# Patient Record
Sex: FEMALE | Race: WHITE | NOT HISPANIC OR LATINO | URBAN - METROPOLITAN AREA
[De-identification: names, ages, dates, MRNs, and addresses within clinical notes are randomized per-mention and may not be internally consistent; named-entity substitution may affect disease eponyms.]

---

## 2019-10-21 ENCOUNTER — OFFICE (OUTPATIENT)
Dept: URBAN - METROPOLITAN AREA CLINIC 101 | Facility: CLINIC | Age: 69
End: 2019-10-21

## 2019-10-21 VITALS
TEMPERATURE: 98.1 F | HEART RATE: 65 BPM | SYSTOLIC BLOOD PRESSURE: 136 MMHG | HEIGHT: 64 IN | WEIGHT: 148 LBS | DIASTOLIC BLOOD PRESSURE: 77 MMHG

## 2019-10-21 DIAGNOSIS — K21.9 GASTRO-ESOPHAGEAL REFLUX DISEASE WITHOUT ESOPHAGITIS: ICD-10-CM

## 2019-10-21 DIAGNOSIS — R13.10 DYSPHAGIA, UNSPECIFIED: ICD-10-CM

## 2019-10-21 DIAGNOSIS — B96.81 HELICOBACTER PYLORI [H. PYLORI] AS THE CAUSE OF DISEASES CLA: ICD-10-CM

## 2019-10-21 DIAGNOSIS — K22.10 ULCER OF ESOPHAGUS WITHOUT BLEEDING: ICD-10-CM

## 2019-10-21 DIAGNOSIS — R05 COUGH: ICD-10-CM

## 2019-10-21 PROCEDURE — 99204 OFFICE O/P NEW MOD 45 MIN: CPT

## 2019-10-21 NOTE — SERVICEHPINOTES
RAMIREZ BARGER   is a   69  female who presents with stomach pain and cough. Hx of GERD. Originally from [place] Had an EGD in August 2019 and it showed esophageal ulcerations. Treated with Clarithromycin and Amoxicillin. Advised to repeat EGD to check healing and now she is here. BRUsed to take omeprazole 20 mg once daily over 5 years.  This was increased to 40 mg for about 6 months ago. She was already taking the 40 mg when she had the EGD in August. BRDenies nausea, vomiting or weight loss. + Cough over a couple of years which attributes to GERD. BRTrouble swallowing with both liquids and solids but more with solids. BRThe acid reflux is better with PPI 40 mg daily. Occasional epigastric pain both on empty stomach and after eating. BRDenies taking NASIDs regularly. Moves bowel daily on BSS type 3. Denies blood in stool, melena, constipation, diarrhea or lower abdominal pain. BRLast colonoscopy was done last year in [facility]. Unremarkable per patient. Denies personal hx of colon polyps. BRTwo cousins possibly has colon cancer.Denies chest pain, palpitations or sob. BR

## 2019-11-15 ENCOUNTER — ON CAMPUS - OUTPATIENT (OUTPATIENT)
Dept: URBAN - METROPOLITAN AREA HOSPITAL 35 | Facility: HOSPITAL | Age: 69
End: 2019-11-15

## 2019-11-15 DIAGNOSIS — K29.60 OTHER GASTRITIS WITHOUT BLEEDING: ICD-10-CM

## 2019-11-15 DIAGNOSIS — R10.13 EPIGASTRIC PAIN: ICD-10-CM

## 2019-11-15 DIAGNOSIS — B37.81 CANDIDAL ESOPHAGITIS: ICD-10-CM

## 2019-11-15 DIAGNOSIS — K20.8 OTHER ESOPHAGITIS: ICD-10-CM

## 2019-11-15 PROCEDURE — 43239 EGD BIOPSY SINGLE/MULTIPLE: CPT

## 2019-12-16 ENCOUNTER — OFFICE (OUTPATIENT)
Dept: URBAN - METROPOLITAN AREA CLINIC 101 | Facility: CLINIC | Age: 69
End: 2019-12-16

## 2019-12-16 VITALS
HEIGHT: 64 IN | DIASTOLIC BLOOD PRESSURE: 66 MMHG | HEART RATE: 70 BPM | SYSTOLIC BLOOD PRESSURE: 116 MMHG | WEIGHT: 149 LBS | TEMPERATURE: 97.6 F

## 2019-12-16 DIAGNOSIS — R13.10 DYSPHAGIA, UNSPECIFIED: ICD-10-CM

## 2019-12-16 DIAGNOSIS — B37.81 CANDIDAL ESOPHAGITIS: ICD-10-CM

## 2019-12-16 DIAGNOSIS — K21.9 GASTRO-ESOPHAGEAL REFLUX DISEASE WITHOUT ESOPHAGITIS: ICD-10-CM

## 2019-12-16 DIAGNOSIS — R07.2 PRECORDIAL PAIN: ICD-10-CM

## 2019-12-16 PROCEDURE — 99214 OFFICE O/P EST MOD 30 MIN: CPT

## 2019-12-16 RX ORDER — PANTOPRAZOLE SODIUM 40 MG/1
TABLET, DELAYED RELEASE ORAL
Qty: 90 | Refills: 3 | Status: ACTIVE
Start: 2019-12-16

## 2019-12-16 NOTE — SERVICEHPINOTES
RAMIREZ BARGER   is a   69  female who presents for follow up. EGD on 11/15/19 showed candida esophagitis and gastritis without evidence of H pylori. Finished fluconazole x 14 days. Dysphagia has improved. BROccasional lower esophagus pain that radiates to the throat and to the back. BRIt occurs sporadically. It does not occur with food intake. BRDenies sob with exertion. BRHas been taking Omeprazole 40 mg once daily.  BRAcid reflux is controlled. Denies nausea or vomiting. Denies taking NSAIDs. Takes Tylenol prn.  BRBowel patterns are normal. Denies blood in stool or melena. BRlast colonoscopy was done last year in [facility]. Denies peroneal hx of polyps. BRHad a barium swallow by pcp. unremarkable per patient. BR